# Patient Record
Sex: MALE | Race: WHITE | Employment: FULL TIME | URBAN - METROPOLITAN AREA
[De-identification: names, ages, dates, MRNs, and addresses within clinical notes are randomized per-mention and may not be internally consistent; named-entity substitution may affect disease eponyms.]

---

## 2017-01-13 DIAGNOSIS — F52.21 ED (ERECTILE DYSFUNCTION) OF NON-ORGANIC ORIGIN: ICD-10-CM

## 2017-01-14 RX ORDER — SILDENAFIL CITRATE 100 MG
TABLET ORAL
Qty: 10 TABLET | Refills: 0 | Status: SHIPPED | OUTPATIENT
Start: 2017-01-14 | End: 2017-04-05 | Stop reason: SDUPTHER

## 2017-04-05 ENCOUNTER — OFFICE VISIT (OUTPATIENT)
Dept: PRIMARY CARE CLINIC | Age: 42
End: 2017-04-05

## 2017-04-05 VITALS
WEIGHT: 269 LBS | HEIGHT: 75 IN | HEART RATE: 60 BPM | TEMPERATURE: 97.4 F | RESPIRATION RATE: 14 BRPM | SYSTOLIC BLOOD PRESSURE: 122 MMHG | DIASTOLIC BLOOD PRESSURE: 86 MMHG | OXYGEN SATURATION: 98 % | BODY MASS INDEX: 33.45 KG/M2

## 2017-04-05 DIAGNOSIS — L73.9 FOLLICULITIS: ICD-10-CM

## 2017-04-05 DIAGNOSIS — F43.9 STRESS: Primary | ICD-10-CM

## 2017-04-05 DIAGNOSIS — F52.21 ED (ERECTILE DYSFUNCTION) OF NON-ORGANIC ORIGIN: ICD-10-CM

## 2017-04-05 DIAGNOSIS — N52.8 OTHER MALE ERECTILE DYSFUNCTION: ICD-10-CM

## 2017-04-05 DIAGNOSIS — R53.83 OTHER FATIGUE: ICD-10-CM

## 2017-04-05 PROCEDURE — 99214 OFFICE O/P EST MOD 30 MIN: CPT | Performed by: FAMILY MEDICINE

## 2017-04-05 RX ORDER — SILDENAFIL 100 MG/1
TABLET, FILM COATED ORAL
Qty: 10 TABLET | Refills: 3 | Status: SHIPPED | OUTPATIENT
Start: 2017-04-05 | End: 2018-04-16 | Stop reason: SDUPTHER

## 2017-04-05 RX ORDER — CEFDINIR 300 MG/1
300 CAPSULE ORAL 2 TIMES DAILY
Qty: 20 CAPSULE | Refills: 0 | Status: SHIPPED | OUTPATIENT
Start: 2017-04-05 | End: 2017-04-15

## 2017-04-05 RX ORDER — FLUOXETINE HYDROCHLORIDE 20 MG/1
CAPSULE ORAL
Qty: 90 CAPSULE | Refills: 2 | Status: SHIPPED | OUTPATIENT
Start: 2017-04-05 | End: 2018-04-16 | Stop reason: SDUPTHER

## 2017-04-05 ASSESSMENT — ENCOUNTER SYMPTOMS
ANAL BLEEDING: 0
ABDOMINAL DISTENTION: 0
EYE DISCHARGE: 0
WHEEZING: 0
APNEA: 0
SHORTNESS OF BREATH: 0

## 2017-04-07 LAB — HSV 2 GLYCOPROTEIN G AB IGG: 0.2 IV

## 2017-04-08 LAB — HIV-1 WESTERN BLOT: NEGATIVE

## 2017-04-14 ENCOUNTER — OFFICE VISIT (OUTPATIENT)
Dept: PRIMARY CARE CLINIC | Age: 42
End: 2017-04-14

## 2017-04-14 VITALS
TEMPERATURE: 96 F | RESPIRATION RATE: 16 BRPM | HEART RATE: 78 BPM | WEIGHT: 267 LBS | HEIGHT: 75 IN | OXYGEN SATURATION: 99 % | DIASTOLIC BLOOD PRESSURE: 80 MMHG | BODY MASS INDEX: 33.2 KG/M2 | SYSTOLIC BLOOD PRESSURE: 126 MMHG

## 2017-04-14 DIAGNOSIS — F43.9 STRESS: Primary | ICD-10-CM

## 2017-04-14 DIAGNOSIS — R53.83 OTHER FATIGUE: ICD-10-CM

## 2017-04-14 PROCEDURE — 99213 OFFICE O/P EST LOW 20 MIN: CPT | Performed by: FAMILY MEDICINE

## 2017-04-14 ASSESSMENT — ENCOUNTER SYMPTOMS: COUGH: 0

## 2018-04-16 ENCOUNTER — OFFICE VISIT (OUTPATIENT)
Dept: PRIMARY CARE CLINIC | Age: 43
End: 2018-04-16
Payer: COMMERCIAL

## 2018-04-16 VITALS
WEIGHT: 269.9 LBS | SYSTOLIC BLOOD PRESSURE: 132 MMHG | BODY MASS INDEX: 33.56 KG/M2 | HEIGHT: 75 IN | HEART RATE: 73 BPM | DIASTOLIC BLOOD PRESSURE: 70 MMHG | RESPIRATION RATE: 16 BRPM | OXYGEN SATURATION: 93 % | TEMPERATURE: 97.5 F

## 2018-04-16 DIAGNOSIS — F43.9 STRESS: ICD-10-CM

## 2018-04-16 DIAGNOSIS — R20.8 BURNING SENSATION: ICD-10-CM

## 2018-04-16 DIAGNOSIS — L82.1 SEBORRHEIC KERATOSES: ICD-10-CM

## 2018-04-16 DIAGNOSIS — F52.4 PREMATURE EJACULATION: Primary | ICD-10-CM

## 2018-04-16 DIAGNOSIS — F52.21 ED (ERECTILE DYSFUNCTION) OF NON-ORGANIC ORIGIN: ICD-10-CM

## 2018-04-16 PROCEDURE — 99214 OFFICE O/P EST MOD 30 MIN: CPT | Performed by: FAMILY MEDICINE

## 2018-04-16 PROCEDURE — 17000 DESTRUCT PREMALG LESION: CPT | Performed by: FAMILY MEDICINE

## 2018-04-16 RX ORDER — FLUOXETINE HYDROCHLORIDE 20 MG/1
CAPSULE ORAL
Qty: 90 CAPSULE | Refills: 5 | Status: SHIPPED | OUTPATIENT
Start: 2018-04-16

## 2018-04-16 RX ORDER — SILDENAFIL 100 MG/1
TABLET, FILM COATED ORAL
Qty: 10 TABLET | Refills: 3 | Status: SHIPPED | OUTPATIENT
Start: 2018-04-16 | End: 2018-05-11

## 2018-04-16 ASSESSMENT — PATIENT HEALTH QUESTIONNAIRE - PHQ9
SUM OF ALL RESPONSES TO PHQ9 QUESTIONS 1 & 2: 0
2. FEELING DOWN, DEPRESSED OR HOPELESS: 0
SUM OF ALL RESPONSES TO PHQ QUESTIONS 1-9: 0
1. LITTLE INTEREST OR PLEASURE IN DOING THINGS: 0

## 2018-04-29 ASSESSMENT — ENCOUNTER SYMPTOMS
EYE DISCHARGE: 0
ABDOMINAL DISTENTION: 0
APNEA: 0
ABDOMINAL PAIN: 0

## 2018-05-11 ENCOUNTER — OFFICE VISIT (OUTPATIENT)
Dept: PRIMARY CARE CLINIC | Age: 43
End: 2018-05-11
Payer: COMMERCIAL

## 2018-05-11 VITALS
SYSTOLIC BLOOD PRESSURE: 116 MMHG | HEIGHT: 75 IN | HEART RATE: 67 BPM | OXYGEN SATURATION: 99 % | BODY MASS INDEX: 34.19 KG/M2 | RESPIRATION RATE: 16 BRPM | WEIGHT: 275 LBS | TEMPERATURE: 97.5 F | DIASTOLIC BLOOD PRESSURE: 84 MMHG

## 2018-05-11 DIAGNOSIS — H10.32 ACUTE BACTERIAL CONJUNCTIVITIS OF LEFT EYE: ICD-10-CM

## 2018-05-11 DIAGNOSIS — F43.9 STRESS: ICD-10-CM

## 2018-05-11 DIAGNOSIS — H00.14 CHALAZION OF LEFT UPPER EYELID: Primary | ICD-10-CM

## 2018-05-11 PROCEDURE — 99213 OFFICE O/P EST LOW 20 MIN: CPT | Performed by: FAMILY MEDICINE

## 2018-05-11 RX ORDER — CEFDINIR 300 MG/1
300 CAPSULE ORAL 2 TIMES DAILY
Qty: 20 CAPSULE | Refills: 0 | Status: SHIPPED | OUTPATIENT
Start: 2018-05-11 | End: 2018-10-31 | Stop reason: SDUPTHER

## 2018-05-11 RX ORDER — SULFACETAMIDE SODIUM 100 MG/ML
2 SOLUTION/ DROPS OPHTHALMIC 4 TIMES DAILY
Qty: 1 BOTTLE | Refills: 1 | Status: SHIPPED | OUTPATIENT
Start: 2018-05-11 | End: 2018-05-21

## 2018-10-31 ENCOUNTER — OFFICE VISIT (OUTPATIENT)
Dept: PRIMARY CARE CLINIC | Age: 43
End: 2018-10-31
Payer: COMMERCIAL

## 2018-10-31 VITALS
OXYGEN SATURATION: 96 % | BODY MASS INDEX: 33.45 KG/M2 | HEIGHT: 75 IN | TEMPERATURE: 97.2 F | RESPIRATION RATE: 16 BRPM | SYSTOLIC BLOOD PRESSURE: 138 MMHG | DIASTOLIC BLOOD PRESSURE: 82 MMHG | WEIGHT: 269 LBS | HEART RATE: 80 BPM

## 2018-10-31 DIAGNOSIS — H10.32 ACUTE BACTERIAL CONJUNCTIVITIS OF LEFT EYE: ICD-10-CM

## 2018-10-31 DIAGNOSIS — F43.9 STRESS: ICD-10-CM

## 2018-10-31 DIAGNOSIS — H00.14 CHALAZION OF LEFT UPPER EYELID: ICD-10-CM

## 2018-10-31 DIAGNOSIS — H00.014 HORDEOLUM EXTERNUM OF LEFT UPPER EYELID: Primary | ICD-10-CM

## 2018-10-31 PROCEDURE — 96372 THER/PROPH/DIAG INJ SC/IM: CPT | Performed by: FAMILY MEDICINE

## 2018-10-31 PROCEDURE — 99213 OFFICE O/P EST LOW 20 MIN: CPT | Performed by: FAMILY MEDICINE

## 2018-10-31 RX ORDER — CEFDINIR 300 MG/1
300 CAPSULE ORAL 2 TIMES DAILY
Qty: 20 CAPSULE | Refills: 0 | Status: SHIPPED | OUTPATIENT
Start: 2018-10-31 | End: 2018-11-10

## 2018-10-31 RX ORDER — CEFTRIAXONE 1 G/1
1 INJECTION, POWDER, FOR SOLUTION INTRAMUSCULAR; INTRAVENOUS ONCE
Status: COMPLETED | OUTPATIENT
Start: 2018-10-31 | End: 2018-10-31

## 2018-10-31 RX ADMIN — CEFTRIAXONE 1 G: 1 INJECTION, POWDER, FOR SOLUTION INTRAMUSCULAR; INTRAVENOUS at 12:50

## 2018-10-31 ASSESSMENT — ENCOUNTER SYMPTOMS
CONSTIPATION: 0
COLOR CHANGE: 0
ABDOMINAL PAIN: 0
EYE DISCHARGE: 1
NAUSEA: 0
BLURRED VISION: 0
STRIDOR: 0
CHEST TIGHTNESS: 0
WHEEZING: 0
EYE REDNESS: 0
CHOKING: 0
EYE PAIN: 0
PHOTOPHOBIA: 0
VOMITING: 0
FOREIGN BODY SENSATION: 0
DIARRHEA: 0
EYE ITCHING: 0
APNEA: 0
DOUBLE VISION: 0
FACIAL SWELLING: 0

## 2018-10-31 NOTE — PROGRESS NOTES
Subjective:      Patient ID: Gentry Whalen is a 38 y.o. male who presents today for:  Chief Complaint   Patient presents with    Stye     Pt is here for left eye stye x 1 day. He started with the oitment from last time and is tender on the outer edge of the eyelid. He never resolved the inner area from last inflammation. In the morning, he had yellow crusting along eye. Eye Problem    The left eye is affected. This is a new problem. The current episode started yesterday. The problem occurs daily. The problem has been waxing and waning. There was no injury mechanism. The pain is at a severity of 2/10. The pain is mild. Associated symptoms include an eye discharge (left). Pertinent negatives include no blurred vision, double vision, eye redness, fever, foreign body sensation, itching, nausea, photophobia, recent URI or vomiting. Treatments tried: Bleph-10. The treatment provided mild relief. Fu Stress  Chronic, stable    No past medical history on file. Past Surgical History:   Procedure Laterality Date    CHOLECYSTECTOMY  2012    ELBOW SURGERY       Family History   Problem Relation Age of Onset    Heart Disease Mother     Diabetes Maternal Aunt      Social History     Social History    Marital status: Single     Spouse name: N/A    Number of children: N/A    Years of education: N/A     Occupational History    Not on file. Social History Main Topics    Smoking status: Current Every Day Smoker    Smokeless tobacco: Never Used    Alcohol use 0.0 oz/week      Comment: moderate    Drug use: No    Sexual activity: Not on file     Other Topics Concern    Not on file     Social History Narrative    No narrative on file     Allergies:  Asa [aspirin]    Review of Systems   Constitutional: Negative for activity change, appetite change, chills, diaphoresis and fever. HENT: Negative for congestion, ear discharge, ear pain, facial swelling, hearing loss and mouth sores.     Eyes: Positive for

## 2018-11-16 ENCOUNTER — OFFICE VISIT (OUTPATIENT)
Dept: PRIMARY CARE CLINIC | Age: 43
End: 2018-11-16
Payer: COMMERCIAL

## 2018-11-16 VITALS
HEART RATE: 64 BPM | WEIGHT: 270 LBS | RESPIRATION RATE: 16 BRPM | BODY MASS INDEX: 33.57 KG/M2 | DIASTOLIC BLOOD PRESSURE: 80 MMHG | HEIGHT: 75 IN | SYSTOLIC BLOOD PRESSURE: 128 MMHG | TEMPERATURE: 97.6 F

## 2018-11-16 DIAGNOSIS — H00.14 CHALAZION OF LEFT UPPER EYELID: ICD-10-CM

## 2018-11-16 DIAGNOSIS — F43.9 STRESS: ICD-10-CM

## 2018-11-16 DIAGNOSIS — H10.32 ACUTE BACTERIAL CONJUNCTIVITIS OF LEFT EYE: ICD-10-CM

## 2018-11-16 DIAGNOSIS — Z72.0 TOBACCO ABUSE: ICD-10-CM

## 2018-11-16 DIAGNOSIS — H00.014 HORDEOLUM EXTERNUM OF LEFT UPPER EYELID: Primary | ICD-10-CM

## 2018-11-16 PROCEDURE — 99213 OFFICE O/P EST LOW 20 MIN: CPT | Performed by: FAMILY MEDICINE

## 2018-11-16 RX ORDER — CEPHALEXIN 500 MG/1
500 CAPSULE ORAL 3 TIMES DAILY
Qty: 30 CAPSULE | Refills: 0 | Status: SHIPPED | OUTPATIENT
Start: 2018-11-16 | End: 2018-11-26

## 2018-11-16 ASSESSMENT — ENCOUNTER SYMPTOMS
PHOTOPHOBIA: 0
DOUBLE VISION: 0
EYE ITCHING: 0
EYE REDNESS: 1
FOREIGN BODY SENSATION: 0
EYE DISCHARGE: 0
VOMITING: 0
NAUSEA: 0
BLURRED VISION: 0

## 2018-11-16 NOTE — PROGRESS NOTES
[aspirin]    Review of Systems   Constitutional: Negative for fever. Eyes: Positive for redness. Negative for blurred vision, double vision, photophobia, discharge and itching. Gastrointestinal: Negative for nausea and vomiting. Objective:   /80   Pulse 64   Temp 97.6 °F (36.4 °C) (Oral)   Resp 16   Ht 6' 3\" (1.905 m)   Wt 270 lb (122.5 kg)   BMI 33.75 kg/m²     Physical Exam   Eyes: Left eye exhibits hordeolum (improved). PHYSICAL EXAMINATION:   VITAL SIGNS: are as recorded. GENERAL:  The patient appears well nourished and well-developed, and with normal affect. No acute respiratory distress. Alert and oriented times 3. No skin rashes. HEENT:  TMs normal bilaterally. Canals and ears normal. Pharynx is clear. Extraocular eye motions intact and pain free. Pupils reactive and equally round. Sclerae and conjunctivae clear, normocephalic and atraumatic. RESPIRATORY:  Clear and equal breath sounds with no acute respiratory distress. HEART: Regular rhythm without murmur, rub or gallop. ABDOMEN:  soft, nontender. No masses, guarding or rebound. Normoactive bowel sounds. LOW BACK: No tenderness to palpation of inferior lumbar spine or either sacroiliac joint area. Assessment:      Diagnosis Orders   1. Hordeolum externum of left upper eyelid  Referral To Ophthalmology - (ELÍAS) Velma Soliman MD - [facility] ([facility])    cephALEXin (KEFLEX) 500 MG capsule   2. Chalazion of left upper eyelid  Referral To Ophthalmology - WILL) Velma Soliman MD - [facility] ([facility])    cephALEXin (KEFLEX) 500 MG capsule   3. Acute bacterial conjunctivitis of left eye  Referral To Ophthalmology - WILL) Velma Soliman MD - [facility] ([facility])    cephALEXin (KEFLEX) 500 MG capsule   4. Tobacco abuse     5.  Stress         Plan:      Orders Placed This Encounter   Procedures    Referral To Ophthalmology - WILL) Saleem Jones MD - [facility])     Referral Priority:   Routine     Referral Type:   Eval and Treat     Referral Reason:   Specialty Services Required     Referred to Provider:   Cary Ross MD     Requested Specialty:   Ophthalmology     Number of Visits Requested:   1     Orders Placed This Encounter   Medications    cephALEXin (KEFLEX) 500 MG capsule     Sig: Take 1 capsule by mouth 3 times daily for 10 days     Dispense:  30 capsule     Refill:  0       Controlled Substances Monitoring:     Return in about 3 weeks (around 12/7/2018). I, XIOMARA Bermudez  , am scribing for and in the presence of Amber Santos MD. Electronically signed by :Humphrey Márquez MD, personally performed the services described in this documentation, as scribed by XIOMARA Bermudez   in my presence, and it is both accurate and complete.  Electronically signed by: Amber Santos MD    11/16/18 5:13 PM    Amber Santos MD

## 2019-03-11 ENCOUNTER — TELEPHONE (OUTPATIENT)
Dept: PRIMARY CARE CLINIC | Age: 44
End: 2019-03-11

## 2023-06-06 ENCOUNTER — TELEPHONE (OUTPATIENT)
Dept: PRIMARY CARE | Facility: CLINIC | Age: 48
End: 2023-06-06
Payer: COMMERCIAL

## 2023-06-07 ENCOUNTER — OFFICE VISIT (OUTPATIENT)
Dept: PRIMARY CARE | Facility: CLINIC | Age: 48
End: 2023-06-07
Payer: COMMERCIAL

## 2023-06-07 VITALS
DIASTOLIC BLOOD PRESSURE: 92 MMHG | RESPIRATION RATE: 16 BRPM | WEIGHT: 256 LBS | BODY MASS INDEX: 32.85 KG/M2 | OXYGEN SATURATION: 96 % | HEIGHT: 74 IN | SYSTOLIC BLOOD PRESSURE: 142 MMHG | HEART RATE: 71 BPM

## 2023-06-07 DIAGNOSIS — M47.27 OSTEOARTHRITIS OF LUMBOSACRAL SPINE WITH RADICULOPATHY: ICD-10-CM

## 2023-06-07 DIAGNOSIS — R20.2 NUMBNESS AND TINGLING OF LEG: ICD-10-CM

## 2023-06-07 DIAGNOSIS — N52.9 ERECTILE DYSFUNCTION, UNSPECIFIED ERECTILE DYSFUNCTION TYPE: ICD-10-CM

## 2023-06-07 DIAGNOSIS — J30.1 SEASONAL ALLERGIC RHINITIS DUE TO POLLEN: ICD-10-CM

## 2023-06-07 DIAGNOSIS — F43.9 STRESS: Primary | ICD-10-CM

## 2023-06-07 DIAGNOSIS — R20.0 NUMBNESS AND TINGLING OF LEG: ICD-10-CM

## 2023-06-07 PROBLEM — R29.898 IMPAIRED FLEXIBILITY OF LOWER EXTREMITY: Status: ACTIVE | Noted: 2023-06-07

## 2023-06-07 PROBLEM — L82.1 SEBORRHEIC KERATOSES: Status: ACTIVE | Noted: 2018-04-16

## 2023-06-07 PROBLEM — M25.551 RIGHT HIP PAIN: Status: ACTIVE | Noted: 2023-06-07

## 2023-06-07 PROBLEM — J40 BRONCHITIS: Status: ACTIVE | Noted: 2023-06-07

## 2023-06-07 PROBLEM — M62.89 MUSCLE STIFFNESS: Status: ACTIVE | Noted: 2023-06-07

## 2023-06-07 PROBLEM — R29.898 WEAKNESS OF RIGHT LOWER EXTREMITY: Status: ACTIVE | Noted: 2023-06-07

## 2023-06-07 PROBLEM — M54.50 LOW BACK PAIN: Status: ACTIVE | Noted: 2023-06-07

## 2023-06-07 PROCEDURE — 99214 OFFICE O/P EST MOD 30 MIN: CPT | Performed by: FAMILY MEDICINE

## 2023-06-07 RX ORDER — SILDENAFIL 100 MG/1
100 TABLET, FILM COATED ORAL AS NEEDED
COMMUNITY
End: 2023-06-07 | Stop reason: SDUPTHER

## 2023-06-07 RX ORDER — SILDENAFIL 100 MG/1
100 TABLET, FILM COATED ORAL AS NEEDED
Qty: 20 TABLET | Refills: 5 | Status: SHIPPED | OUTPATIENT
Start: 2023-06-07 | End: 2023-09-14 | Stop reason: SDUPTHER

## 2023-06-07 RX ORDER — LORATADINE 10 MG/1
10 TABLET ORAL DAILY
Qty: 30 TABLET | Refills: 5 | Status: SHIPPED | OUTPATIENT
Start: 2023-06-07 | End: 2024-06-06

## 2023-06-07 NOTE — PROGRESS NOTES
Subjective   Patient ID: Mook Tyler is a 47 y.o. male who presents for Follow-up and seasonal allergie.    Pt c/o seasonal allergies. Sx dry throat x Monday     Follow up getting medication refill.          Review of Systems  12 Systems have been reviewed as follows.  Constitutional: Fever, weight gain, weight loss, appetite change, night sweats, fatigue, chills.  Eyes : blurry, double vision, vision, loss, tearing, redness, pain, sensitivity to light, glaucoma.  Ears, nose, mouth, and throat: Hearing loss, ringing in the ears, ear pain, nasal congestion, nasal drainage, nosebleeds, mouth, throat, irritation tooth problem.  Cardiovascular :chest pain, pressure, heart racing, palpitations, sweating, leg swelling, high or low blood pressure  Pulmonary: Cough, yellow or green sputum, blood and sputum, shortness of breath, wheezing  Gastrointestinal: Nausea, vomiting, diarrhea, constipation, pain, blood in stool, or vomitus, heartburn, difficulty swallowing  Genitourinary: incontinence, abnormal bleeding, abnormal discharge, urinary frequency, urinary hesitancy, pain, impotence sexual problem, infection, urinary retention  Musculoskeletal: Pain, stiffness, joint, redness or warmth, arthritis, back pain, weakness, muscle wasting, sprain or fracture  Neuro: Weight weakness, dizziness, change in voice, change in taste change in vision, change in hearing, loss, or change of sensation, trouble walking, balance problems coordination problems, shaking, speech problem  Endocrine , cold or heat intolerance, blood sugar problem, weight gain or loss missed periods hot flashes, sweats, change in body hair, change in libido, increased thirst, increased urination  Heme/lymph: Swelling, bleeding, problem anemia, bruising, enlarged lymph nodes  Allergic/immunologic: H. plus nasal drip, watery itchy eyes, nasal drainage, immunosuppressed  The above were reviewed and noted negative except as noted in HPI and Problem List.    Objective  "  BP (!) 142/92   Pulse 71   Resp 16   Ht 1.88 m (6' 2\")   Wt 116 kg (256 lb)   SpO2 96%   BMI 32.87 kg/m²     Physical Exam  Constitutional: Well developed, well nourished, alert and in no acute distress   Eyes: Normal external exam. Pupils equally round and reactive to light with normal accommodation and extraocular movements intact.  Neck: Supple, no lymphadenopathy or masses.   Cardiovascular: Regular rate and rhythm, normal S1 and S2, no murmurs, gallops, or rubs. Radial pulses normal. No peripheral edema.  Pulmonary: No respiratory distress, lungs clear to auscultation bilaterally. No wheezes, rhonchi, rales.  Abdomen: soft,non tender, non distended, without masses or HSM  Skin: Warm, well perfused, normal skin turgor and color.   Neurologic: Cranial nerves II-XII grossly intact.   Psychiatric: Mood calm and affect normal  Musculoskeletal: Moving all extremities without restriction    Assessment/Plan   Problem List Items Addressed This Visit          Nervous    Numbness and tingling of leg    Relevant Orders    Follow Up In [facility] - PCP    Osteoarthritis of lumbosacral spine with radiculopathy    Relevant Orders    Follow Up In [facility] - PCP       Genitourinary    Erectile dysfunction    Relevant Medications    sildenafil (Viagra) 100 mg tablet    Other Relevant Orders    Follow Up In [facility] - PCP       Other    Stress - Primary    Relevant Orders    Follow Up In [facility] - PCP    Seasonal allergic rhinitis due to pollen    Relevant Medications    loratadine (Claritin) 10 mg tablet    Other Relevant Orders    Follow Up In [facility] - PCP     Continue current medications and therapy for chronic medical conditions    HM next       "

## 2023-09-13 DIAGNOSIS — N52.9 ERECTILE DYSFUNCTION, UNSPECIFIED ERECTILE DYSFUNCTION TYPE: ICD-10-CM

## 2023-09-15 RX ORDER — SILDENAFIL 100 MG/1
100 TABLET, FILM COATED ORAL AS NEEDED
Qty: 20 TABLET | Refills: 5 | Status: SHIPPED | OUTPATIENT
Start: 2023-09-15 | End: 2023-09-18 | Stop reason: SDUPTHER

## 2023-09-18 DIAGNOSIS — N52.9 ERECTILE DYSFUNCTION, UNSPECIFIED ERECTILE DYSFUNCTION TYPE: ICD-10-CM

## 2023-09-18 RX ORDER — SILDENAFIL 100 MG/1
100 TABLET, FILM COATED ORAL AS NEEDED
Qty: 20 TABLET | Refills: 5 | Status: SHIPPED | OUTPATIENT
Start: 2023-09-18

## 2023-09-18 NOTE — TELEPHONE ENCOUNTER
PT NEEDS REFILL ON SILDENAFIL 100MG  [facility] IN [place]          SCRIPT WAS SENT TO WRONG PHARMACY, PLEASE CANCEL SCRIPT THAT WENT TO [facility] AND SEND TO [place] IN [place].

## 2025-06-25 ENCOUNTER — OFFICE VISIT (OUTPATIENT)
Dept: URGENT CARE | Age: 50
End: 2025-06-25
Payer: COMMERCIAL

## 2025-06-25 ENCOUNTER — ANCILLARY PROCEDURE (OUTPATIENT)
Dept: URGENT CARE | Age: 50
End: 2025-06-25
Payer: COMMERCIAL

## 2025-06-25 VITALS
TEMPERATURE: 98.3 F | RESPIRATION RATE: 20 BRPM | WEIGHT: 250 LBS | HEART RATE: 65 BPM | DIASTOLIC BLOOD PRESSURE: 96 MMHG | OXYGEN SATURATION: 98 % | HEIGHT: 74 IN | SYSTOLIC BLOOD PRESSURE: 156 MMHG | BODY MASS INDEX: 32.08 KG/M2

## 2025-06-25 DIAGNOSIS — M25.562 LEFT MEDIAL KNEE PAIN: ICD-10-CM

## 2025-06-25 DIAGNOSIS — S83.412A SPRAIN OF MEDIAL COLLATERAL LIGAMENT OF LEFT KNEE, INITIAL ENCOUNTER: Primary | ICD-10-CM

## 2025-06-25 PROCEDURE — 73564 X-RAY EXAM KNEE 4 OR MORE: CPT | Mod: LEFT SIDE

## 2025-06-25 RX ORDER — ACETAMINOPHEN 500 MG
1000 TABLET ORAL ONCE
Status: COMPLETED | OUTPATIENT
Start: 2025-06-25 | End: 2025-06-25

## 2025-06-25 RX ADMIN — Medication 1000 MG: at 18:12

## 2025-06-25 NOTE — LETTER
June 25, 2025     Patient: Mook Tyler   YOB: 1975   Date of Visit: 6/25/2025       To Whom It May Concern:    It is my medical opinion that Mook Tyler may return to work on 06/27/2025.    If you have any questions or concerns, please don't hesitate to call.         Sincerely,        Rj Lin, APRN-CNP    CC: No Recipients

## 2025-06-25 NOTE — PROGRESS NOTES
Subjective   Patient ID: Mook Tyler is a 49 y.o. male. They present today with a chief complaint of No chief complaint on file..    History of Present Illness  Subjective   Patient presents with three hours of left knee pain that began when he stood up from a squatting position. He states that he felt a popping sensation and subsequently developed pain along the medial left knee. He states that he is able to bear weight; however, it causes him significant pain. He denies numbness, weakness, or paresthesia. He did not take any medication prior to arrival.     Review of Systems  Constitutional: Negative for fever, chills, anorexia, weight loss, malaise   Musculoskeletal: See HPI  Neurological: Negative for weakness, paresthesia    Skin: Negative for mass, itching, rash, ulcer    Hematologic/Lymph: Negative bruising  All other systems are negative        History provided by:  Patient   used: No        Past Medical History  Allergies as of 06/25/2025 - Reviewed 06/25/2025   Allergen Reaction Noted    Aspirin Other 07/07/2014       Prescriptions Prior to Admission[1]     Medical History[2]    Surgical History[3]     reports that he has quit smoking. His smoking use included cigarettes. He has never used smokeless tobacco. He reports current alcohol use. He reports that he does not use drugs.    The patient's medical history, current medications, and allergies were reviewed and verified during today's visit.  Any discrepancies and updates were made when necessary.       Review of Systems  Review of Systems                               Objective    There were no vitals filed for this visit.  No LMP for male patient.    Physical Exam  Vitals and nursing note reviewed.   Constitutional:       General: He is not in acute distress.     Appearance: Normal appearance. He is normal weight. He is not ill-appearing, toxic-appearing or diaphoretic.   Cardiovascular:      Rate and Rhythm: Normal rate.       Pulses: Normal pulses.   Pulmonary:      Effort: Pulmonary effort is normal. No respiratory distress.   Musculoskeletal:         General: Swelling present. No tenderness, deformity or signs of injury. Normal range of motion.      Left knee: Swelling present. No deformity, erythema, ecchymosis, bony tenderness or crepitus. Normal range of motion. No tenderness. No LCL laxity, MCL laxity, ACL laxity or PCL laxity.Normal alignment and normal meniscus.      Instability Tests: Anterior drawer test negative. Posterior drawer test negative.   Skin:     General: Skin is warm and dry.      Coloration: Skin is not jaundiced or pale.      Findings: No bruising, erythema or rash.   Neurological:      General: No focal deficit present.      Mental Status: He is alert and oriented to person, place, and time.      Sensory: No sensory deficit.         Procedures    Point of Care Test & Imaging Results from this visit  No results found for this visit on 06/25/25.   Imaging  No results found.    Cardiology, Vascular, and Other Imaging  No other imaging results found for the past 2 days      Diagnostic study results (if any) were reviewed by RAINA Bateman.    Assessment/Plan   Allergies, medications, history, and pertinent labs/EKGs/Imaging reviewed by RAINA Bateman.     Medical Decision Making    On exam, patient is in no acute distress; however, he is experiencing significant discomfort.  The knee joint is stable without ligamentous laxity or obvious deformity.  Xray showed:    Osseous fragmentation of the tibial tubercle may relate to remote fracture or remote Osgood Schlatter's disease. There is mild pretibial soft tissue edema. Mild degenerative changes and a small  Effusion.    We discussed the radiologist's findings and he confirmed a history of Osgood Schlatter's disease.  I recommended he follow-up with orthopedics due to the severity of his pain and difficulty ambulating.  I offered crutches, but he  stated that he may have some at home.  I supplied him with an ace wrap and placed a referral in his chart.  He will continue rest, ice, compression, and elevation with OTC analgesics.  He althea not have any additional questions and he was wheeled to his car by staff.     At time of discharge patient was clinically well-appearing and appropriate for outpatient management. The patient was educated regarding diagnosis, supportive care, OTC and Rx medications. The patient was given the opportunity to ask questions prior to discharge.  They verbalized understanding of my discussion of the plans for treatment, expected course, indications to return to  or seek further evaluation in ED, and the need for timely follow up as directed.          Orders and Diagnoses  There are no diagnoses linked to this encounter.    Medical Admin Record      Patient disposition: Home    Electronically signed by RAINA Bateman  5:40 PM             [1] (Not in a hospital admission)  [2]   Past Medical History:  Diagnosis Date    Male erectile dysfunction, unspecified 07/27/2022    Erectile dysfunction    Other seborrheic keratosis 08/12/2020    Seborrheic keratoses   [3]   Past Surgical History:  Procedure Laterality Date    OTHER SURGICAL HISTORY  07/05/2019    Arm surgery    OTHER SURGICAL HISTORY  07/05/2019    [place] tooth extraction    OTHER SURGICAL HISTORY  07/05/2019    Cholecystectomy

## 2025-06-26 ENCOUNTER — OFFICE VISIT (OUTPATIENT)
Dept: ORTHOPEDIC SURGERY | Facility: CLINIC | Age: 50
End: 2025-06-26
Payer: COMMERCIAL

## 2025-06-26 DIAGNOSIS — M23.92 ACUTE INTERNAL DERANGEMENT OF LEFT KNEE: Primary | ICD-10-CM

## 2025-06-26 PROCEDURE — 99203 OFFICE O/P NEW LOW 30 MIN: CPT | Performed by: PHYSICIAN ASSISTANT

## 2025-06-26 RX ORDER — METHYLPREDNISOLONE 4 MG/1
TABLET ORAL
Qty: 1 EACH | Refills: 0 | Status: SHIPPED | OUTPATIENT
Start: 2025-06-26

## 2025-06-26 NOTE — PROGRESS NOTES
History of Present Illness  49 y.o. male presenting to the orthopedic injury clinic for left knee pain.  Patient states yesterday he was getting up from a squat when he felt a pop in the medial aspect of the left knee.  Patient states since then the knee has been sore and swollen.  He went to urgent care yesterday and was given Tylenol which has improved his pain somewhat.  He notes the left knee has been little sore over the last month after twisting his ankle.  he is allergic to NSAIDs.    Imaging  Radiographs Knee: No acute fractures or dislocations.  Mild arthritic changes.  Evidence of prior Osgood slaughters.    Assessment  Left knee possible internal derangement    Plan  We reviewed the role of imaging, physical therapy, injections and the time frame to healing and correlation with outcome.  Medrol Dosepak  Tylenol  Ice: 30 minutes on and off  Exercise home program: Medically directed knee therapy / Handout given  Follow-up in 3 to 4 weeks for reevaluation.  If no better may consider an MRI versus cortisone injection     Physical Exam  Well-nourished, well-developed. No acute distress. Alert and oriented x3. Responds appropriately to questioning. Good mood. Normal affect.  Physical Exam  Left knee:  Mild effusion  ROM: 0 to 110 degrees flexion  Positive Dl´s test/PositiveApley Grind  Neurovascular exam normal distally  Palpable pedal pulse and good cap refill     Review of Systems   GENERAL: Negative for malaise, significant weight loss, fever  MUSCULOSKELETAL: See HPI  NEURO:  Negative     Medical History[1]    Medication Documentation Review Audit       Reviewed by Tracy Johnson MA (Medical Assistant) on 25 at 1746      Medication Order Taking? Sig Documenting Provider Last Dose Status   loratadine (Claritin) 10 mg tablet [id number]  Take 1 tablet (10 mg) by mouth once daily. Lai Moses MD   24 3730   sildenafil (Viagra) 100 mg tablet [id number]  Take 1 tablet (100 mg) by  mouth if needed for erectile dysfunction.   Patient not taking: Reported on 6/25/2025    Lai Moses MD  Active                    RX Allergies[2]    Social History     Socioeconomic History    Marital status: Single     Spouse name: Not on file    Number of children: Not on file    Years of education: Not on file    Highest education level: Not on file   Occupational History    Not on file   Tobacco Use    Smoking status: Every Day     Current packs/day: 0.50     Average packs/day: 0.5 packs/day for 36.5 years (18.2 ttl pk-yrs)     Types: Cigarettes     Start date: 1989    Smokeless tobacco: Never   Substance and Sexual Activity    Alcohol use: Yes    Drug use: Never    Sexual activity: Not on file   Other Topics Concern    Not on file   Social History Narrative    Not on file     Social Drivers of Health     Financial Resource Strain: Not on file   Food Insecurity: Not on file   Transportation Needs: Not on file   Physical Activity: Not on file   Stress: Not on file   Social Connections: Not on file   Intimate Partner Violence: Not on file   Housing Stability: Not on file       Surgical History[3]    Imaging  XR knee left 4+ views  Result Date: 6/25/2025  Osseous fragmentation of the tibial tubercle may relate to remote fracture or remote Osgood Schlatter's disease. There is mild pretibial soft tissue edema. Mild degenerative changes and a small effusion   MACRO: None   Signed by: Hernan Kwong 6/25/2025 6:15 PM Dictation workstation:   [id number]      Cardiology, Vascular, and Other Imaging  No other imaging results found for the past 7 days                                    [1]   Past Medical History:  Diagnosis Date    Male erectile dysfunction, unspecified 07/27/2022    Erectile dysfunction    Other seborrheic keratosis 08/12/2020    Seborrheic keratoses   [2]   Allergies  Allergen Reactions    Aspirin Other   [3]   Past Surgical History:  Procedure Laterality Date    OTHER SURGICAL HISTORY   07/05/2019    Arm surgery    OTHER SURGICAL HISTORY  07/05/2019    [place] tooth extraction    OTHER SURGICAL HISTORY  07/05/2019    Cholecystectomy

## 2025-07-08 NOTE — PROGRESS NOTES
History of Present Illness  7/10/2025: The knee does continue to bother him today. It is sore and swollen. He describes mechanical symptoms with popping.     6/26/2025: 49 y.o. male presenting to the orthopedic injury clinic for left knee pain.  Patient states yesterday he was getting up from a squat when he felt a pop in the medial aspect of the left knee.  Patient states since then the knee has been sore.  He went to urgent care yesterday and was given Tylenol which has improved his pain somewhat.  He notes the left knee has been little sore over the last month after twisting his ankle.  he is allergic to NSAIDs.    Imaging  X-rays left knee: No acute fractures or dislocations.  Mild arthritic changes.  Evidence of prior Osgood slaughters.    Assessment  Left knee internal derangement possible medial meniscus tear.     Plan  We reviewed the role of imaging, physical therapy, injections and the time frame to healing and correlation with outcome.  Ice and Tylenol as needed.   At this point we will get a MRI of the left knee for a meniscus tear. I think this is medically reasonable and appropriate given his recurrent symptoms and swelling. We will see him back after imaging for a more definitive plan.     Physical Exam  Well-nourished, well-developed. No acute distress. Alert and oriented x3. Responds appropriately to questioning. Good mood. Normal affect.  Physical Exam  Left knee:  1+ effusion  ROM: 0 to 120 degrees flexion  Pain along medial joint line   Positive Dl´s test/PositiveApley Grind  Neurovascular exam normal distally  Palpable pedal pulse and good cap refill     Review of Systems   GENERAL: Negative for malaise, significant weight loss, fever  MUSCULOSKELETAL: See HPI  NEURO:  Negative     Medical History[1]    Medication Documentation Review Audit       Reviewed by Natasha Bear MA (Medical Assistant) on 06/26/25 at 0937      Medication Order Taking? Sig Documenting Provider Last Dose  Status   acetaminophen (Tylenol) tablet 1,000 mg [id number]   Rj Lin, APRN-CNP   25 1812   loratadine (Claritin) 10 mg tablet [id number]  Take 1 tablet (10 mg) by mouth once daily. Lai Moses MD   24 2359   sildenafil (Viagra) 100 mg tablet [id number]  Take 1 tablet (100 mg) by mouth if needed for erectile dysfunction.   Patient not taking: Reported on 2025    Lai Moses MD  Active                    RX Allergies[2]    Social History     Socioeconomic History    Marital status: Single     Spouse name: Not on file    Number of children: Not on file    Years of education: Not on file    Highest education level: Not on file   Occupational History    Not on file   Tobacco Use    Smoking status: Every Day     Current packs/day: 0.50     Average packs/day: 0.5 packs/day for 36.5 years (18.3 ttl pk-yrs)     Types: Cigarettes     Start date:     Smokeless tobacco: Never   Substance and Sexual Activity    Alcohol use: Yes    Drug use: Never    Sexual activity: Not on file   Other Topics Concern    Not on file   Social History Narrative    Not on file     Social Drivers of Health     Financial Resource Strain: Not on file   Food Insecurity: Not on file   Transportation Needs: Not on file   Physical Activity: Not on file   Stress: Not on file   Social Connections: Not on file   Intimate Partner Violence: Not on file   Housing Stability: Not on file       Surgical History[3]    Imaging  No results found.      Cardiology, Vascular, and Other Imaging  No other imaging results found for the past 7 days     Scribe Attestation:  By signing my name below, ILatoya Scribe attest that this documentation has been prepared under the direction and in the presence of Mook Crowe MD.    Provider Attestation - Scribe documentation:  All medical record entries made by Latoya Reynolds were at my direction and personally dictated by me, Mook Crowe MD. I have reviewed the chart and  agree that the record is accurate and I confirmed that it reflects my personal performance of the history, physical exam, discussion, and plan.          [1]   Past Medical History:  Diagnosis Date    Male erectile dysfunction, unspecified 07/27/2022    Erectile dysfunction    Other seborrheic keratosis 08/12/2020    Seborrheic keratoses   [2]   Allergies  Allergen Reactions    Aspirin Other   [3]   Past Surgical History:  Procedure Laterality Date    OTHER SURGICAL HISTORY  07/05/2019    Arm surgery    OTHER SURGICAL HISTORY  07/05/2019    [place] tooth extraction    OTHER SURGICAL HISTORY  07/05/2019    Cholecystectomy

## 2025-07-10 ENCOUNTER — APPOINTMENT (OUTPATIENT)
Dept: ORTHOPEDIC SURGERY | Facility: CLINIC | Age: 50
End: 2025-07-10
Payer: COMMERCIAL

## 2025-07-10 DIAGNOSIS — M23.92 INTERNAL DERANGEMENT OF LEFT KNEE: Primary | ICD-10-CM

## 2025-07-10 DIAGNOSIS — S83.412A SPRAIN OF MEDIAL COLLATERAL LIGAMENT OF LEFT KNEE, INITIAL ENCOUNTER: ICD-10-CM

## 2025-07-10 DIAGNOSIS — S83.232A COMPLEX TEAR OF MEDIAL MENISCUS OF LEFT KNEE AS CURRENT INJURY, INITIAL ENCOUNTER: ICD-10-CM

## 2025-07-21 ENCOUNTER — HOSPITAL ENCOUNTER (OUTPATIENT)
Dept: RADIOLOGY | Facility: HOSPITAL | Age: 50
Discharge: HOME | End: 2025-07-21
Payer: COMMERCIAL

## 2025-07-21 DIAGNOSIS — S83.232A COMPLEX TEAR OF MEDIAL MENISCUS OF LEFT KNEE AS CURRENT INJURY, INITIAL ENCOUNTER: ICD-10-CM

## 2025-07-21 PROCEDURE — 73721 MRI JNT OF LWR EXTRE W/O DYE: CPT | Mod: LT

## 2025-07-21 PROCEDURE — 73721 MRI JNT OF LWR EXTRE W/O DYE: CPT | Mod: LEFT SIDE | Performed by: STUDENT IN AN ORGANIZED HEALTH CARE EDUCATION/TRAINING PROGRAM

## 2025-07-22 NOTE — PROGRESS NOTES
Chief Complaint   Patient presents with    Left Knee - Follow-up     Xr 6/25/25  Acute Internal Derangement  MRI review      History of Present Illness  7/25/2025: He is here today to review the results of his recent MRI of the left  knee.      7/10/2025: The knee does continue to bother him today. It is sore and swollen. He describes mechanical symptoms with popping.     6/26/2025: 49 y.o. male presenting to the orthopedic injury clinic for left knee pain.  Patient states yesterday he was getting up from a squat when he felt a pop in the medial aspect of the left knee.  Patient states since then the knee has been sore.  He went to urgent care yesterday and was given Tylenol which has improved his pain somewhat.  He notes the left knee has been little sore over the last month after twisting his ankle.  he is allergic to NSAIDs.    Imaging  X-rays left knee: No acute fractures or dislocations.  Mild arthritic changes.  Evidence of prior Osgood slaughters.  MRI left knee: Shows a small bucket-handle medial meniscus tear with mild patellofemoral cartilage wear.     Assessment  Left bucket-handle medial meniscus tear.     Plan  We reviewed the role of imaging, physical therapy, injections and the time frame to healing and correlation with outcome.  Ice and Tylenol as needed.   I recommended a left knee arthroscopy medial meniscectomy. He will remain out of work until after surgery. We discussed that he will potentially be out of work for up to a month or more post-op.     Left knee arthroscopic medial menisectomy plan of care:  Risks benefits and alternatives to surgery were discussed including but not limited to Infection, bleeding, neurovascular injury, pain and dysfunction, hardware related complications including cutout failure breakage, loss of function, motion, and permanent disability as well as the cardiovascular and pulmonary complications from anesthesia including death and DVT. Patient and family accept  these risks.    We discussed specifically post meniscectomy pain, incomplete pain relief, meniscus retear, progressive arthritis, intraoperative decisions in regards to other pathology, and the potential for future revision surgeries including arthroplasty.     Plan for outpatient surgery:    1 week postop follow up.   Percocet for postop pain relief. OARRS has been reviewed and is consistent with prescribed medications. This report is scanned into the electronic medical record. The risks of abuse, dependence, addiction and diversion were considered. The medication is felt to be clinically appropriate based on documented diagnosis.  Pre-CERT for removal postoperative knee brace.     Physical Exam  Well-nourished, well-developed. No acute distress. Alert and oriented x3. Responds appropriately to questioning. Good mood. Normal affect.  Physical Exam  Left knee:  1+ effusion  ROM: 0 to 120 degrees flexion  Pain along medial joint line   Positive Dl´s test/PositiveApley Grind  Neurovascular exam normal distally  Palpable pedal pulse and good cap refill     Review of Systems   GENERAL: Negative for malaise, significant weight loss, fever  MUSCULOSKELETAL: See HPI  NEURO:  Negative     Medical History[1]    Medication Documentation Review Audit       Reviewed by Natasha Bear MA (Medical Assistant) on 25 at 0937      Medication Order Taking? Sig Documenting Provider Last Dose Status   acetaminophen (Tylenol) tablet 1,000 mg [id number]   SHARDA Bateman-CNP   25 1812   loratadine (Claritin) 10 mg tablet [id number]  Take 1 tablet (10 mg) by mouth once daily. Lai Moses MD   24 2359   sildenafil (Viagra) 100 mg tablet [id number]  Take 1 tablet (100 mg) by mouth if needed for erectile dysfunction.   Patient not taking: Reported on 2025    Lai Moses MD  Active                    RX Allergies[2]    Social History     Socioeconomic History    Marital status: Single      Spouse name: Not on file    Number of children: Not on file    Years of education: Not on file    Highest education level: Not on file   Occupational History    Not on file   Tobacco Use    Smoking status: Every Day     Current packs/day: 0.50     Average packs/day: 0.5 packs/day for 36.6 years (18.3 ttl pk-yrs)     Types: Cigarettes     Start date: 1989    Smokeless tobacco: Never   Substance and Sexual Activity    Alcohol use: Yes    Drug use: Never    Sexual activity: Not on file   Other Topics Concern    Not on file   Social History Narrative    Not on file     Social Drivers of Health     Financial Resource Strain: Not on file   Food Insecurity: Not on file   Transportation Needs: Not on file   Physical Activity: Not on file   Stress: Not on file   Social Connections: Not on file   Intimate Partner Violence: Not on file   Housing Stability: Not on file       Surgical History[3]    Imaging  MR knee left wo IV contrast  Result Date: 7/21/2025  1. Bucket-handle tear of the medial meniscus body segment extending into the inner free margins of the anterior and posterior horns. 2. Moderate osteoarthrosis of the patellofemoral compartment and mild osteoarthrosis of the lateral femorotibial compartment. 3. Moderate-sized knee joint effusion and small popliteal cyst.   I personally reviewed the images/study and I agree with the findings as stated. This study was interpreted at [place], [place].   MACRO: None   Signed by: Min Coon 7/21/2025 11:33 AM Dictation workstation:   [id number]        Cardiology, Vascular, and Other Imaging  No other imaging results found for the past 7 days     Scribe Attestation:  By signing my name below, I, Checo Rodriguez attest that this documentation has been prepared under the direction and in the presence of Mook Crowe MD.    Provider Attestation - Scribe documentation:  All medical record entries made by Latoya Reynolds were at  my direction and personally dictated by me, Mook Crowe MD. I have reviewed the chart and agree that the record is accurate and I confirmed that it reflects my personal performance of the history, physical exam, discussion, and plan.          [1]   Past Medical History:  Diagnosis Date    Male erectile dysfunction, unspecified 07/27/2022    Erectile dysfunction    Other seborrheic keratosis 08/12/2020    Seborrheic keratoses   [2]   Allergies  Allergen Reactions    Aspirin Other   [3]   Past Surgical History:  Procedure Laterality Date    OTHER SURGICAL HISTORY  07/05/2019    Arm surgery    OTHER SURGICAL HISTORY  07/05/2019    [place] tooth extraction    OTHER SURGICAL HISTORY  07/05/2019    Cholecystectomy

## 2025-07-25 ENCOUNTER — OFFICE VISIT (OUTPATIENT)
Dept: ORTHOPEDIC SURGERY | Facility: CLINIC | Age: 50
End: 2025-07-25
Payer: COMMERCIAL

## 2025-07-25 ENCOUNTER — TELEPHONE (OUTPATIENT)
Dept: ORTHOPEDIC SURGERY | Facility: CLINIC | Age: 50
End: 2025-07-25

## 2025-07-25 DIAGNOSIS — S83.212A BUCKET-HANDLE TEAR OF MEDIAL MENISCUS OF LEFT KNEE AS CURRENT INJURY, INITIAL ENCOUNTER: Primary | ICD-10-CM

## 2025-07-25 PROCEDURE — 99212 OFFICE O/P EST SF 10 MIN: CPT | Performed by: ORTHOPAEDIC SURGERY

## 2025-07-25 NOTE — LETTER
July 25, 2025     Patient: Mook Tyler   YOB: 1975   Date of Visit: 7/25/2025       To Whom It May Concern:    It is my medical opinion that Mook Tyler should remain out of work until 9/17/2025.    If you have any questions or concerns, please don't hesitate to call.         Sincerely,        Mook Crowe MD

## 2025-07-29 ENCOUNTER — HOSPITAL ENCOUNTER (OUTPATIENT)
Dept: CARDIOLOGY | Facility: HOSPITAL | Age: 50
Discharge: HOME | End: 2025-07-29
Payer: COMMERCIAL

## 2025-07-29 ENCOUNTER — LAB (OUTPATIENT)
Facility: HOSPITAL | Age: 50
End: 2025-07-29
Payer: COMMERCIAL

## 2025-07-29 DIAGNOSIS — Z01.818 PRE-OP TESTING: ICD-10-CM

## 2025-07-29 PROCEDURE — 80053 COMPREHEN METABOLIC PANEL: CPT | Performed by: PHYSICIAN ASSISTANT

## 2025-07-29 PROCEDURE — 93010 ELECTROCARDIOGRAM REPORT: CPT | Performed by: INTERNAL MEDICINE

## 2025-07-29 PROCEDURE — 93005 ELECTROCARDIOGRAM TRACING: CPT

## 2025-07-29 PROCEDURE — 85730 THROMBOPLASTIN TIME PARTIAL: CPT | Performed by: PHYSICIAN ASSISTANT

## 2025-07-29 PROCEDURE — 85025 COMPLETE CBC W/AUTO DIFF WBC: CPT | Performed by: PHYSICIAN ASSISTANT

## 2025-07-30 LAB
ATRIAL RATE: 70 BPM
P AXIS: 55 DEGREES
P OFFSET: 199 MS
P ONSET: 145 MS
PR INTERVAL: 156 MS
Q ONSET: 223 MS
QRS COUNT: 11 BEATS
QRS DURATION: 80 MS
QT INTERVAL: 380 MS
QTC CALCULATION(BAZETT): 410 MS
QTC FREDERICIA: 400 MS
R AXIS: -23 DEGREES
T AXIS: -14 DEGREES
T OFFSET: 413 MS
VENTRICULAR RATE: 70 BPM

## 2025-08-05 ENCOUNTER — DOCUMENTATION (OUTPATIENT)
Dept: ORTHOPEDIC SURGERY | Facility: CLINIC | Age: 50
End: 2025-08-05
Payer: COMMERCIAL

## 2025-08-05 DIAGNOSIS — S83.232A COMPLEX TEAR OF MEDIAL MENISCUS OF LEFT KNEE AS CURRENT INJURY, INITIAL ENCOUNTER: Primary | ICD-10-CM

## 2025-08-05 RX ORDER — OXYCODONE AND ACETAMINOPHEN 5; 325 MG/1; MG/1
1 TABLET ORAL EVERY 6 HOURS PRN
Qty: 20 TABLET | Refills: 0 | Status: SHIPPED | OUTPATIENT
Start: 2025-08-06 | End: 2025-08-13

## 2025-08-05 NOTE — PROGRESS NOTES
No chief complaint on file.     History of Present Illness  7/25/2025: He is here today to review the results of his recent MRI of the left  knee.      7/10/2025: The knee does continue to bother him today. It is sore and swollen. He describes mechanical symptoms with popping.     6/26/2025: 49 y.o. male presenting to the orthopedic injury clinic for left knee pain.  Patient states yesterday he was getting up from a squat when he felt a pop in the medial aspect of the left knee.  Patient states since then the knee has been sore.  He went to urgent care yesterday and was given Tylenol which has improved his pain somewhat.  He notes the left knee has been little sore over the last month after twisting his ankle.  he is allergic to NSAIDs.    Imaging  X-rays left knee: No acute fractures or dislocations.  Mild arthritic changes.  Evidence of prior Osgood slaughters.  MRI left knee: Shows a small bucket-handle medial meniscus tear with mild patellofemoral cartilage wear.     Assessment  Left bucket-handle medial meniscus tear.     Plan  We reviewed the role of imaging, physical therapy, injections and the time frame to healing and correlation with outcome.  Ice and Tylenol as needed.   I recommended a left knee arthroscopy medial meniscectomy. He will remain out of work until after surgery. We discussed that he will potentially be out of work for up to a month or more post-op.     Left knee arthroscopic medial menisectomy plan of care:  Risks benefits and alternatives to surgery were discussed including but not limited to Infection, bleeding, neurovascular injury, pain and dysfunction, hardware related complications including cutout failure breakage, loss of function, motion, and permanent disability as well as the cardiovascular and pulmonary complications from anesthesia including death and DVT. Patient and family accept these risks.    We discussed specifically post meniscectomy pain, incomplete pain relief,  meniscus retear, progressive arthritis, intraoperative decisions in regards to other pathology, and the potential for future revision surgeries including arthroplasty.     Plan for outpatient surgery:    1 week postop follow up.   Percocet for postop pain relief. OARRS has been reviewed and is consistent with prescribed medications. This report is scanned into the electronic medical record. The risks of abuse, dependence, addiction and diversion were considered. The medication is felt to be clinically appropriate based on documented diagnosis.  Pre-CERT for removal postoperative knee brace.     Physical Exam  Well-nourished, well-developed. No acute distress. Alert and oriented x3. Responds appropriately to questioning. Good mood. Normal affect.  Head/neck: Atraumatic, normocephalic  Cardiovascular: Palpable pulse regular to extremities  Pulmonary: Respirations regular, no audible wheeze  Abdomen: Soft, nondistended    Left knee:  1+ effusion  ROM: 0 to 120 degrees flexion  Pain along medial joint line   Positive Dl´s test/PositiveApley Grind  Neurovascular exam normal distally  Palpable pedal pulse and good cap refill     Review of Systems   GENERAL: Negative for malaise, significant weight loss, fever  MUSCULOSKELETAL: See HPI  NEURO:  Negative     Medical History[1]    Medication Documentation Review Audit       Reviewed by Natasha Bear MA (Medical Assistant) on 25 at 0937      Medication Order Taking? Sig Documenting Provider Last Dose Status   acetaminophen (Tylenol) tablet 1,000 mg [id number]   Rj Lin, APRN-CNP   25 1812   loratadine (Claritin) 10 mg tablet [id number]  Take 1 tablet (10 mg) by mouth once daily. Lai Moses MD   24 2359   sildenafil (Viagra) 100 mg tablet [id number]  Take 1 tablet (100 mg) by mouth if needed for erectile dysfunction.   Patient not taking: Reported on 2025    Lai Moses MD  Active                    RX  Allergies[2]    Social History     Socioeconomic History    Marital status: Single     Spouse name: Not on file    Number of children: Not on file    Years of education: Not on file    Highest education level: Not on file   Occupational History    Not on file   Tobacco Use    Smoking status: Every Day     Current packs/day: 0.50     Average packs/day: 0.7 packs/day for 54.9 years (36.6 ttl pk-yrs)     Types: Cigarettes     Start date: 1989    Smokeless tobacco: Never   Substance and Sexual Activity    Alcohol use: Yes     Alcohol/week: 5.0 standard drinks of alcohol     Types: 5 Standard drinks or equivalent per week    Drug use: Never    Sexual activity: Yes   Other Topics Concern    Not on file   Social History Narrative    Not on file     Social Drivers of Health     Financial Resource Strain: Not on file   Food Insecurity: Not on file   Transportation Needs: Not on file   Physical Activity: Not on file   Stress: Not on file   Social Connections: Not on file   Intimate Partner Violence: Not on file   Housing Stability: Not on file       Surgical History[3]    Imaging  No results found.        Cardiology, Vascular, and Other Imaging  ECG 12 lead (Ancillary Performed)  Result Date: 7/30/2025  Sinus rhythm with occasional Premature ventricular complexes Minimal voltage criteria for LVH, may be normal variant ( R in aVL ) Nonspecific ST abnormality Abnormal ECG No previous ECGs available Confirmed by Steve Manuel ([id number]) on 7/30/2025 12:18:04 PM              [1]   Past Medical History:  Diagnosis Date    Male erectile dysfunction, unspecified 07/27/2022    Erectile dysfunction    Other seborrheic keratosis 08/12/2020    Seborrheic keratoses    Tear of meniscus of knee [id number]   [2]   Allergies  Allergen Reactions    Aspirin Other   [3]   Past Surgical History:  Procedure Laterality Date    OTHER SURGICAL HISTORY  07/05/2019    Arm surgery    OTHER SURGICAL HISTORY  07/05/2019    [place] tooth extraction    OTHER  SURGICAL HISTORY  07/05/2019    Cholecystectomy

## 2025-08-06 PROCEDURE — 29881 ARTHRS KNE SRG MNISECTMY M/L: CPT | Performed by: ORTHOPAEDIC SURGERY

## 2025-08-07 ENCOUNTER — OFFICE VISIT (OUTPATIENT)
Dept: ORTHOPEDIC SURGERY | Facility: CLINIC | Age: 50
End: 2025-08-07
Payer: COMMERCIAL

## 2025-08-07 DIAGNOSIS — R21 RASH: ICD-10-CM

## 2025-08-07 RX ORDER — DIPHENHYDRAMINE HCL 25 MG
25 TABLET ORAL NIGHTLY PRN
Qty: 30 TABLET | Refills: 0 | Status: SHIPPED | OUTPATIENT
Start: 2025-08-07 | End: 2025-09-06

## 2025-08-07 RX ORDER — HYDROCORTISONE 1 %
CREAM (GRAM) TOPICAL 2 TIMES DAILY
Qty: 1 G | Refills: 1 | Status: SHIPPED | OUTPATIENT
Start: 2025-08-07

## 2025-08-07 NOTE — PROGRESS NOTES
History of Present Illness:  Date of Surgery: 8/6/25 left knee arthroscopic medial meniscectomy. He states that about an hour ago he noticed he had some hives on his head. He did take a Percocet last night with no reaction until today though.    Exam:  Mild effusion  Incision is clean, dry, intact, and healing well  No calf tenderness   Negative Royal's test  Distal neurovascular exam intact    Assessment:  Patient status post left knee arthroscopic medial meniscectomy    Plan:  Prescription for Benadryl and hydrocortisone cream today.  Discussed being seen in the ER if it gets worse  Encouraged a follow-up with his PCP for the hives.   Follow-up next Friday for the knee.     Scribe Attestation:  By signing my name below, I, Checo Rodriguez attest that this documentation has been prepared under the direction and in the presence of Mook Crowe MD.    Provider Attestation - Scribe documentation:  All medical record entries made by Latoya Reynolds were at my direction and personally dictated by me, Mook Crowe MD. I have reviewed the chart and agree that the record is accurate and I confirmed that it reflects my personal performance of the history, physical exam, discussion, and plan.

## 2025-08-08 ENCOUNTER — APPOINTMENT (OUTPATIENT)
Dept: ORTHOPEDIC SURGERY | Facility: CLINIC | Age: 50
End: 2025-08-08
Payer: COMMERCIAL

## 2025-08-14 ENCOUNTER — APPOINTMENT (OUTPATIENT)
Dept: ORTHOPEDIC SURGERY | Facility: CLINIC | Age: 50
End: 2025-08-14
Payer: COMMERCIAL

## 2025-08-14 DIAGNOSIS — S83.232A COMPLEX TEAR OF MEDIAL MENISCUS OF LEFT KNEE AS CURRENT INJURY, INITIAL ENCOUNTER: Primary | ICD-10-CM

## 2025-09-11 ENCOUNTER — APPOINTMENT (OUTPATIENT)
Dept: ORTHOPEDIC SURGERY | Facility: CLINIC | Age: 50
End: 2025-09-11
Payer: COMMERCIAL